# Patient Record
Sex: MALE | Race: WHITE | NOT HISPANIC OR LATINO | Employment: FULL TIME | URBAN - METROPOLITAN AREA
[De-identification: names, ages, dates, MRNs, and addresses within clinical notes are randomized per-mention and may not be internally consistent; named-entity substitution may affect disease eponyms.]

---

## 2024-05-13 ENCOUNTER — TELEPHONE (OUTPATIENT)
Dept: UROLOGY | Facility: CLINIC | Age: 34
End: 2024-05-13

## 2024-05-13 NOTE — TELEPHONE ENCOUNTER
----- Message from Ambar Martinez sent at 5/13/2024  3:02 PM CDT -----  Type:  Appointment Request    Name of Caller:pts wife Anita  When is the first available appointment?No access  Symptoms:vasectomy   Would the patient rather a call back or a response via [facility]? Call back   Best Call Back Number:[phone]  Additional Information: Patients wife states she would like to schedule an appointment for the patient for a vasectomy. Patients wife states the patient does not have a current coverage but would like to get some information regarding scheduling as well as pricing. Patients wife would like a call back with further assistance as soon as possible.

## 2024-05-13 NOTE — TELEPHONE ENCOUNTER
Spoke with pt's wife. Pt's wife states she would like the price for a vasectomy. Provided billing department number. Will call back to schedule. No further questions or concerns noted.